# Patient Record
Sex: FEMALE
[De-identification: names, ages, dates, MRNs, and addresses within clinical notes are randomized per-mention and may not be internally consistent; named-entity substitution may affect disease eponyms.]

---

## 2022-11-17 ENCOUNTER — NURSE TRIAGE (OUTPATIENT)
Dept: OTHER | Facility: CLINIC | Age: 37
End: 2022-11-17

## 2022-11-18 NOTE — TELEPHONE ENCOUNTER
Sore throat   Cough   Location of patient: [place]    Subjective: Caller states \"sore throat, cough\"     Current Symptoms:   Positive covid   Requesting information regarding paxlovid      Recommended disposition: Call PCP in the morning when office open. Care advice provided, patient verbalizes understanding; denies any other questions or concerns.     Outcome: Calling PCP in the morning     Reason for Disposition   COVID-19 Disease, questions about    Protocols used: Coronavirus (COVID-19) Diagnosed or Suspected-ADULT-